# Patient Record
Sex: FEMALE | Race: BLACK OR AFRICAN AMERICAN | Employment: FULL TIME | URBAN - METROPOLITAN AREA
[De-identification: names, ages, dates, MRNs, and addresses within clinical notes are randomized per-mention and may not be internally consistent; named-entity substitution may affect disease eponyms.]

---

## 2024-05-11 ENCOUNTER — HOSPITAL ENCOUNTER (EMERGENCY)
Age: 64
Discharge: HOME | End: 2024-05-11
Attending: EMERGENCY MEDICINE
Payer: COMMERCIAL

## 2024-05-11 ENCOUNTER — APPOINTMENT (OUTPATIENT)
Dept: GENERAL RADIOLOGY | Age: 64
End: 2024-05-11
Payer: COMMERCIAL

## 2024-05-11 VITALS
RESPIRATION RATE: 14 BRPM | TEMPERATURE: 96.8 F | BODY MASS INDEX: 40.92 KG/M2 | HEIGHT: 68 IN | WEIGHT: 270 LBS | OXYGEN SATURATION: 100 % | HEART RATE: 64 BPM | DIASTOLIC BLOOD PRESSURE: 82 MMHG | SYSTOLIC BLOOD PRESSURE: 127 MMHG

## 2024-05-11 DIAGNOSIS — S96.911A ANKLE STRAIN, RIGHT, INITIAL ENCOUNTER: ICD-10-CM

## 2024-05-11 DIAGNOSIS — M62.830 SPASM OF LEFT TRAPEZIUS MUSCLE: Primary | ICD-10-CM

## 2024-05-11 PROCEDURE — 73610 X-RAY EXAM OF ANKLE: CPT

## 2024-05-11 PROCEDURE — 96372 THER/PROPH/DIAG INJ SC/IM: CPT | Performed by: EMERGENCY MEDICINE

## 2024-05-11 PROCEDURE — 6370000000 HC RX 637 (ALT 250 FOR IP)

## 2024-05-11 PROCEDURE — 99284 EMERGENCY DEPT VISIT MOD MDM: CPT | Performed by: EMERGENCY MEDICINE

## 2024-05-11 PROCEDURE — 6360000002 HC RX W HCPCS

## 2024-05-11 RX ORDER — KETOROLAC TROMETHAMINE 15 MG/ML
15 INJECTION, SOLUTION INTRAMUSCULAR; INTRAVENOUS ONCE
Status: COMPLETED | OUTPATIENT
Start: 2024-05-11 | End: 2024-05-11

## 2024-05-11 RX ORDER — LIDOCAINE 4 G/G
1 PATCH TOPICAL DAILY
Status: DISCONTINUED | OUTPATIENT
Start: 2024-05-11 | End: 2024-05-11 | Stop reason: HOSPADM

## 2024-05-11 RX ORDER — METHOCARBAMOL 750 MG/1
750 TABLET, FILM COATED ORAL 4 TIMES DAILY
Qty: 40 TABLET | Refills: 0 | Status: SHIPPED | OUTPATIENT
Start: 2024-05-11 | End: 2024-05-21

## 2024-05-11 RX ORDER — LIDOCAINE 50 MG/G
1 PATCH TOPICAL DAILY
Qty: 10 PATCH | Refills: 0 | Status: SHIPPED | OUTPATIENT
Start: 2024-05-11 | End: 2024-05-21

## 2024-05-11 RX ADMIN — KETOROLAC TROMETHAMINE 15 MG: 15 INJECTION, SOLUTION INTRAMUSCULAR; INTRAVENOUS at 14:21

## 2024-05-11 ASSESSMENT — PAIN DESCRIPTION - LOCATION: LOCATION: ANKLE;ARM

## 2024-05-11 ASSESSMENT — PAIN - FUNCTIONAL ASSESSMENT: PAIN_FUNCTIONAL_ASSESSMENT: 0-10

## 2024-05-11 ASSESSMENT — PAIN DESCRIPTION - ORIENTATION: ORIENTATION: LEFT;RIGHT

## 2024-05-11 ASSESSMENT — PAIN SCALES - GENERAL: PAINLEVEL_OUTOF10: 5

## 2024-05-11 NOTE — DISCHARGE INSTRUCTIONS
Please keep your scheduled follow-up appointment with occupational health for Monday at 2 PM.  For pain you can alternate Tylenol and ibuprofen every 4 hours.  Use the muscle relaxant, Robaxin, as needed for muscle spasming.  You may also use ice, heat and lidocaine patches which were also sent to your pharmacy.    For your ankle use the Ace wrap for compression and comfort.  Please follow the ankle mobility exercises that were provided in your discharge packet.    Please return to the emergency department if you develop neck pain, inability to move your neck, inability move your arm, loss of sensation in the arm or weakness.  Inability to walk, loss of sensation in the ankle or foot, fevers, chills, headaches, vision changes, chest pain or shortness of breath.
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
## 2024-06-12 ENCOUNTER — HOSPITAL ENCOUNTER (OUTPATIENT)
Dept: PHYSICAL THERAPY | Facility: CLINIC | Age: 64
Setting detail: THERAPIES SERIES
Discharge: HOME | End: 2024-06-12
Payer: COMMERCIAL

## 2024-06-12 PROCEDURE — 97110 THERAPEUTIC EXERCISES: CPT

## 2024-06-12 PROCEDURE — 97161 PT EVAL LOW COMPLEX 20 MIN: CPT
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
## 2024-06-17 ENCOUNTER — HOSPITAL ENCOUNTER (OUTPATIENT)
Dept: PHYSICAL THERAPY | Facility: CLINIC | Age: 64
Setting detail: THERAPIES SERIES
Discharge: HOME | End: 2024-06-17
Payer: COMMERCIAL

## 2024-06-17 NOTE — FLOWSHEET NOTE
[] [facility]  Outpatient Rehabilitation &  Therapy  [address]  [phone]  [phone] [] [facility]  Outpatient Rehabilitation &  Therapy  [address]  P: [phone]  F: [phone] [x] [facility]  Outpatient Rehabilitation &  Therapy  [address]  P: [phone]  F: [phone] [] [facility]  Outpatient Rehabilitation &  Therapy  [address]  [phone]  [phone] [] [facility]  Outpatient Rehabilitation &  Therapy  [address]   P: [phone]  F: [phone]  [] [facility]  Outpatient Rehabilitation &  Therapy  [address]  P: [phone]  F: [phone] [] [facility]  Outpatient Rehabilitation &  Therapy  [address]  P: [phone]  F: [phone] [] [facility]  Outpatient Rehabilitation &  Therapy  [address]  P: [phone]  F: [phone] [] [facility]  Outpatient Rehabilitation &  Therapy  [address]  P: [phone]  F: [phone]  [] [facility] Outpatient Rehabilitation &  Therapy  [address]  P: [phone]  F: [phone]     Therapy Cancel/No Show note    Date: 2024  Patient: Lucía Metz  : 1960  MRN: [id number]    Cancels/No Shows to date: 0/0. Today's cx will not count against pt    For today's appointment patient:    [x]  Cancelled    [] Rescheduled appointment    [] No-show     Reason given by patient:    []  Patient ill    []  Conflicting appointment    [] No transportation      [] Conflict with work    [] No reason given    [] Weather related    [] COVID-19    [] Other:      Comments:  C-9 did not include aquatics. Will submit for new C-9      [] Next appointment was confirmed    Electronically signed by: Ghazal Andre PTA

## 2024-06-19 ENCOUNTER — HOSPITAL ENCOUNTER (OUTPATIENT)
Dept: PHYSICAL THERAPY | Facility: CLINIC | Age: 64
Setting detail: THERAPIES SERIES
Discharge: HOME | End: 2024-06-19
Payer: COMMERCIAL

## 2024-06-19 PROCEDURE — 97113 AQUATIC THERAPY/EXERCISES: CPT

## 2024-06-19 NOTE — FLOWSHEET NOTE
[x] [facility] Outpatient Rehabilitation & Therapy  [address]  P: [phone]  F: [phone]     Physical Therapy Daily  Aquatic Treatment Note    Date:  2024  Patient Name:  Lucía Metz    :  1960  MRN: [id number]  Physician: Shamir Smallwood MD                                  Insurance:  APPROVED 9 VISITS (3X WK FOR 3WKS)5.10.24-24  AUTH #[id number]     Medical Diagnosis: Sprain of left shoulder, right ankle and strain of muscle, fascia and tendon at neck                         Rehab Codes: R26.2, M54.2, M25.571  Onset Date: 5/10/24                                  Next 's appt: 24    Visit# / total visits: 2/  Cancels/No Shows: 1/0    Subjective:    Pain:  [x] Yes  [] No Location: R ankle, L neck, R knee Pain Rating: (0-10 scale) 8/10 (R leg) 5-6/10( R neck)  Pain altered Tx:  [x] No  [] Yes  Action:  Comments: pt arrives today stating she is having a lot of pain today in multiple areas. States pain cont to \"spread\" d/t her walking different and compensating for her original injury.    Objective:     KEY  B = Belt G = Gloves N = Noodle   C = Cuffs K = Kickboard P = Paddles   CC = Cervical Collar L = Laps T = Theratube   DB = Dumbells M = Minutes W = Weights     Exercises/Activities  Warm-up/Amb  Dynamic Exercises    Forward 3L N March    Sideways 3L N Squat    Backwards 3L N Retro HS curls      Retro SLR    Stretches  Braiding    Gastroc/Soleus  Heel to Toe amb    Hamstring  Toe amb    Hip flexor  Heel amb    Piriformis      SKTC      Pec Stretch      Post Deltoid  Static Exercises UE      Shoulder flex/ext 10   Static Exercises LE  Shoulder abd/add 10   Heel/toe raises 10 N & UE Shoulder H.  abd/add 10   March 10 N & UE Shoulder IR/ER    Mini-squats  Rowing    4-way hip   Arm Circles    Hamstring curls 10 N & UE UT shrugs/rolls    Hip Circles/Fig 8  Scap squeezes    Ankle ROM  Diagonals 1/2    Lunges   Elbow flex/ext

## 2024-06-21 ENCOUNTER — HOSPITAL ENCOUNTER (OUTPATIENT)
Dept: PHYSICAL THERAPY | Facility: CLINIC | Age: 64
Setting detail: THERAPIES SERIES
Discharge: HOME | End: 2024-06-21
Payer: COMMERCIAL

## 2024-06-21 PROCEDURE — 97113 AQUATIC THERAPY/EXERCISES: CPT

## 2024-06-21 NOTE — FLOWSHEET NOTE
[x] [facility] Outpatient Rehabilitation & Therapy  [address]  P: [phone]  F: [phone]     Physical Therapy Daily  Aquatic Treatment Note    Date:  2024  Patient Name:  Lucía Metz    :  1960  MRN: [id number]  Physician: Shamir Smallwood MD                                  Insurance:  APPROVED 9 VISITS (3X WK FOR 3WKS)5.10.24-24  AUTH #[id number]     Medical Diagnosis: Sprain of left shoulder, right ankle and strain of muscle, fascia and tendon at neck                         Rehab Codes: R26.2, M54.2, M25.571  Onset Date: 5/10/24                                  Next 's appt: 24    Visit# / total visits: 3/9  Cancels/No Shows: 1/0    Subjective:    Pain:  [x] Yes  [] No Location: R ankle, L neck, R knee Pain Rating: (0-10 scale) 6.5/10 (R leg) 5/10( R neck)  Pain altered Tx:  [x] No  [] Yes  Action:  Comments: Pt reports she took a pain pill last night so pain is reduced slightly this am. Has been compliant with HEP.     Objective:     KEY  B = Belt G = Gloves N = Noodle   C = Cuffs K = Kickboard P = Paddles   CC = Cervical Collar L = Laps T = Theratube   DB = Dumbells M = Minutes W = Weights     Exercises/Activities  Warm-up/Amb  Dynamic Exercises    Forward 3L N March    Sideways 3L N Squat    Backwards 3L N Retro HS curls      Retro SLR    Stretches  Braiding    Gastroc/Soleus 2x10\" Heel to Toe amb    Hamstring  Toe amb    Hip flexor  Heel amb    Piriformis      SKTC      Pec Stretch      Post Deltoid  Static Exercises UE      Shoulder flex/ext 10   Static Exercises LE  Shoulder abd/add 10   Heel/toe raises 10 N & UE Shoulder H.  abd/add 10   Marches 10 N & UE Shoulder IR/ER    Mini-squats  Rowing    4-way hip   Arm Circles    Hamstring curls 10 N & UE UT shrugs/rolls x10   Hip Circles/Fig 8  Cervical ROM x10   Ankle ROM  UT S 2x20\"   Lunges   Elbow flex/ext      Pron/Sup    Functional Exercise  Wrist AROM    Step      Wall

## 2024-06-24 ENCOUNTER — HOSPITAL ENCOUNTER (OUTPATIENT)
Dept: PHYSICAL THERAPY | Facility: CLINIC | Age: 64
Setting detail: THERAPIES SERIES
Discharge: HOME | End: 2024-06-24
Payer: COMMERCIAL

## 2024-06-24 PROCEDURE — 97113 AQUATIC THERAPY/EXERCISES: CPT

## 2024-06-24 NOTE — FLOWSHEET NOTE
[x] [facility] Outpatient Rehabilitation & Therapy  [address]  P: [phone]  F: [phone]     Physical Therapy Daily  Aquatic Treatment Note    Date:  2024  Patient Name:  Lucía Metz    :  1960  MRN: [id number]  Physician: Shamir Smallwood MD                                  Insurance:  APPROVED 9 VISITS (3X WK FOR 3WKS)5.10.24-24  AUTH #[id number]     Medical Diagnosis: Sprain of left shoulder, right ankle and strain of muscle, fascia and tendon at neck                         Rehab Codes: R26.2, M54.2, M25.571  Onset Date: 5/10/24                                  Next 's appt: 24    Visit# / total visits:   Cancels/No Shows: 1/0    Subjective:    Pain:  [x] Yes  [] No Location: R ankle, L neck, R knee Pain Rating: (0-10 scale) 7/10 (R leg) 5/10( R neck)  Pain altered Tx:  [x] No  [] Yes  Action:  Comments:   Objective:     KEY  B = Belt G = Gloves N = Noodle   C = Cuffs K = Kickboard P = Paddles   CC = Cervical Collar L = Laps T = Theratube   DB = Dumbells M = Minutes W = Weights     Exercises/Activities  Warm-up/Amb  Dynamic Exercises    Forward 3L N March    Sideways 3L N Squat    Backwards 3L N Retro HS curls      Retro SLR    Stretches  Braiding    Gastroc/Soleus 2x10\" Heel to Toe amb    Hamstring  Toe amb    Hip flexor  Heel amb    Piriformis      SKTC      Pec Stretch      Post Deltoid  Static Exercises UE      Shoulder flex/ext 10   Static Exercises LE  Shoulder abd/add 10   Heel/toe raises 10 N & UE Shoulder H.  abd/add 10   Marches 10 N & UE Shoulder IR/ER    Mini-squats  Rowing      Scapular retraction 10x5\"   4-way hip   Arm Circles    Hamstring curls 10 N & UE UT shrugs/rolls x10   Hip Circles/Fig 8  Cervical ROM 10x5\"   Ankle ROM  UT S 2x20\"   Lunges   Elbow flex/ext      Pron/Sup    Functional Exercise  Wrist AROM    Step      Wall Push-ups  Deep H20/    SLS  Bike    Breast Stroke on Noodle  Hip abd/add    Noodle

## 2024-06-26 ENCOUNTER — HOSPITAL ENCOUNTER (OUTPATIENT)
Dept: PHYSICAL THERAPY | Facility: CLINIC | Age: 64
Setting detail: THERAPIES SERIES
Discharge: HOME | End: 2024-06-26
Payer: COMMERCIAL

## 2024-06-26 PROCEDURE — 97113 AQUATIC THERAPY/EXERCISES: CPT

## 2024-06-26 NOTE — FLOWSHEET NOTE
[x] [facility] Outpatient Rehabilitation & Therapy  [address]  P: [phone]  F: [phone]     Physical Therapy Daily  Aquatic Treatment Note    Date:  2024  Patient Name:  Lucía Metz    :  1960  MRN: [id number]  Physician: Shamir Smallwood MD                                  Insurance:  APPROVED 9 VISITS (3X WK FOR 3WKS)5.10.24-24  AUTH #[id number]     Medical Diagnosis: Sprain of left shoulder, right ankle and strain of muscle, fascia and tendon at neck                         Rehab Codes: R26.2, M54.2, M25.571  Onset Date: 5/10/24                                  Next 's appt: 24    Visit# / total visits:   Cancels/No Shows: 1/0    Subjective:    Pain:  [x] Yes  [] No Location: R ankle, L neck, R knee Pain Rating: (0-10 scale) 6/10 (R leg) \"stiff\"/10( R neck)  Pain altered Tx:  [x] No  [] Yes  Action:  Comments: pt arrives today stating she is having a lot of \"stabbing\" type pain in her knee which has been happening for a while. States her neck isnt necessarily in pain but it is very stiff.   Objective:     KEY  B = Belt G = Gloves N = Noodle   C = Cuffs K = Kickboard P = Paddles   CC = Cervical Collar L = Laps T = Theratube   DB = Dumbells M = Minutes W = Weights     Exercises/Activities  Warm-up/Amb  Dynamic Exercises    Forward 3L N March    Sideways 3L N Squat    Backwards 3L N Retro HS curls      Retro SLR    Stretches  Braiding    Gastroc/Soleus 2x10\" Heel to Toe amb    Hamstring  Toe amb    Hip flexor  Heel amb    Piriformis      SKTC      Pec Stretch      Post Deltoid  Static Exercises UE      Shoulder flex/ext 10   Static Exercises LE  Shoulder abd/add 10   Heel/toe raises 10  Shoulder H.  abd/add 10   Marches 10  Shoulder IR/ER    Mini-squats 10 Rowing      Scapular retraction 10x5\"   4-way hip  10 Arm Circles    Hamstring curls 10  UT shrugs/rolls x10   Hip Circles/Fig 8  Cervical ROM 10x5\"   Ankle ROM  UT S 2x20\"

## 2024-06-28 ENCOUNTER — HOSPITAL ENCOUNTER (OUTPATIENT)
Dept: PHYSICAL THERAPY | Facility: CLINIC | Age: 64
Setting detail: THERAPIES SERIES
Discharge: HOME | End: 2024-06-28
Payer: COMMERCIAL

## 2024-06-28 PROCEDURE — 97113 AQUATIC THERAPY/EXERCISES: CPT

## 2024-06-28 NOTE — FLOWSHEET NOTE
[x] [facility] Outpatient Rehabilitation & Therapy  [address]  P: [phone]  F: [phone]     Physical Therapy Daily  Aquatic Treatment Note    Date:  2024  Patient Name:  Lucía Metz    :  1960  MRN: [id number]  Physician: Shamir Smallwood MD                                  Insurance:  APPROVED 9 VISITS (3X WK FOR 3WKS)5.10.24-24  AUTH #[id number]     Medical Diagnosis: Sprain of left shoulder, right ankle and strain of muscle, fascia and tendon at neck                         Rehab Codes: R26.2, M54.2, M25.571  Onset Date: 5/10/24                                  Next 's appt:     Visit# / total visits:   Cancels/No Shows: 1/0    Subjective:    Pain:  [x] Yes  [] No Location: R ankle, L neck, R knee Pain Rating: (0-10 scale) 6/10 (R leg) 5/10( L shoulder)  Pain altered Tx:  [x] No  [] Yes  Action:  Comments: pt feels less pain overall and better ROM.  Pt feels she wants to get better to RTW as soon as possible.  Objective:     KEY  B = Belt G = Gloves N = Noodle   C = Cuffs K = Kickboard P = Paddles   CC = Cervical Collar L = Laps T = Theratube   DB = Dumbells M = Minutes W = Weights     Exercises/Activities  Warm-up/Amb  Dynamic Exercises    Forward 3L N March    Sideways 3L N Squat    Backwards 3L N Retro HS curls      Retro SLR    Stretches  Braiding    Gastroc/Soleus 2x10\" Heel to Toe amb    Hamstring  Toe amb    Hip flexor  Heel amb    Piriformis      SKTC      Pec Stretch      Post Deltoid  Static Exercises UE      Shoulder flex/ext 10   Static Exercises LE  Shoulder abd/add 10   Heel/toe raises 10  Shoulder H.  abd/add 10    10  Shoulder IR/ER    Mini-squats 10 Rowing      Scapular retraction 10x5\"   4-way hip  10 Arm Circles    Hamstring curls 10  UT shrugs/rolls x10   Hip Circles/Fig 8  Cervical ROM 10x5\"   Ankle ROM  UT S 2x20\"   Lunges   Elbow flex/ext      Pron/Sup    Functional Exercise  Wrist AROM    Step

## 2024-07-01 ENCOUNTER — HOSPITAL ENCOUNTER (OUTPATIENT)
Dept: PHYSICAL THERAPY | Facility: CLINIC | Age: 64
Setting detail: THERAPIES SERIES
Discharge: HOME | End: 2024-07-01
Payer: COMMERCIAL

## 2024-07-01 PROCEDURE — 97113 AQUATIC THERAPY/EXERCISES: CPT

## 2024-07-01 NOTE — FLOWSHEET NOTE
Education: [x] Verbal  [x] Demo  [] Written  Comprehension of Education:  [] Verbalizes understanding.  [] Demonstrates understanding.  [x] Needs review.  [] Demonstrates/verbalizes HEP/Ed previously given.     Plan: [x] Continue per plan of care.   [x] Other:Deep water hang/float done indep end of session and not billed.      Time In: 11:00 am            Time Out:  12:00 pm    Electronically signed by:  Nancie Keenan PTA

## 2024-07-03 ENCOUNTER — HOSPITAL ENCOUNTER (OUTPATIENT)
Dept: PHYSICAL THERAPY | Facility: CLINIC | Age: 64
Setting detail: THERAPIES SERIES
Discharge: HOME | End: 2024-07-03
Payer: COMMERCIAL

## 2024-07-03 ENCOUNTER — APPOINTMENT (OUTPATIENT)
Dept: GENERAL RADIOLOGY | Age: 64
End: 2024-07-03
Payer: COMMERCIAL

## 2024-07-03 ENCOUNTER — APPOINTMENT (OUTPATIENT)
Dept: CT IMAGING | Age: 64
End: 2024-07-03
Payer: COMMERCIAL

## 2024-07-03 ENCOUNTER — HOSPITAL ENCOUNTER (EMERGENCY)
Age: 64
Discharge: HOME | End: 2024-07-04
Attending: SPECIALIST
Payer: COMMERCIAL

## 2024-07-03 VITALS
DIASTOLIC BLOOD PRESSURE: 79 MMHG | SYSTOLIC BLOOD PRESSURE: 128 MMHG | OXYGEN SATURATION: 98 % | HEART RATE: 104 BPM | HEIGHT: 68 IN | TEMPERATURE: 98.1 F | WEIGHT: 274 LBS | RESPIRATION RATE: 18 BRPM | BODY MASS INDEX: 41.52 KG/M2

## 2024-07-03 DIAGNOSIS — S16.1XXA STRAIN OF NECK MUSCLE, INITIAL ENCOUNTER: ICD-10-CM

## 2024-07-03 DIAGNOSIS — S39.012A STRAIN OF LUMBAR REGION, INITIAL ENCOUNTER: ICD-10-CM

## 2024-07-03 DIAGNOSIS — S09.90XA CLOSED HEAD INJURY, INITIAL ENCOUNTER: Primary | ICD-10-CM

## 2024-07-03 PROCEDURE — 72100 X-RAY EXAM L-S SPINE 2/3 VWS: CPT

## 2024-07-03 PROCEDURE — 97113 AQUATIC THERAPY/EXERCISES: CPT

## 2024-07-03 PROCEDURE — 72040 X-RAY EXAM NECK SPINE 2-3 VW: CPT

## 2024-07-03 PROCEDURE — 99284 EMERGENCY DEPT VISIT MOD MDM: CPT

## 2024-07-03 PROCEDURE — 70450 CT HEAD/BRAIN W/O DYE: CPT

## 2024-07-03 ASSESSMENT — ENCOUNTER SYMPTOMS
SORE THROAT: 0
COUGH: 0
NAUSEA: 0
BACK PAIN: 1
SHORTNESS OF BREATH: 0
ABDOMINAL PAIN: 0

## 2024-07-03 ASSESSMENT — PAIN SCALES - GENERAL: PAINLEVEL_OUTOF10: 8

## 2024-07-03 ASSESSMENT — PAIN - FUNCTIONAL ASSESSMENT: PAIN_FUNCTIONAL_ASSESSMENT: 0-10

## 2024-07-03 NOTE — FLOWSHEET NOTE
H20/    SLS  Bike    Breast Stroke on Noodle  Hip abd/add    Noodle Twist  Hip flex/ext    Noodle Push down  Hip IR/ER    Kickboard push/pull  Knee flex/ext      Push/pull on Rail      Hang 10m    Other: Pt prefers to hang with no support and can float \"naturally.\"    Specific Instructions for next treatment: steps    Treatment Charges: Mins Units   []  Modalities     []  Ther Exercise     []  Manual Therapy     []  Ther Activities     [x]  Aquatics 35 (10:10-10:45 am) 2   []  Other       Assessment: [x] Progressing toward goals. Began with 3 directional warm up laps, pt utilizing noodle for stability, per request. Completed all interventions, per log, inc all reps this date. Max cueing needed for ex recall. Pt with good maryse to tx and no adverse effects. Concluded with deep water hang for decompression and soreness relief.     [] No change.     [] Other:    STG: (to be met in 18 treatments)  ? Pain: right ankle to 3/10 at worst to improve walking tolerance  ? Pain cervical to 2/10 at worst to improve sleep and ability to drive  ? ROM:left cervical rotation to no > 10% limitation to allow for driving  ? ROM: right ankle DF to no > 5 from N to improve quality of gait  ? Strength: right ankle to 4/5 to normalize gait  ? Strength left shoulder strength to 4+/5 to allow for lifting  Patient to be independent with home exercise program as demonstrated by performance with correct form without cues.     LTG: (to be met in 20 treatments)  LEFS 20% or less impaired  NPDI 20% or less impaired  Patient able to drive without neck pain  Normal gait without deviation     Patient goals: \"To feel better\"    Pt. Education:  [x] Yes  [] No  [] Reviewed Prior HEP/Ed  Method of Education: [x] Verbal  [] Demo  [] Written  Comprehension of Education:  [] Verbalizes understanding.  [] Demonstrates understanding.  [x] Needs review.  [] Demonstrates/verbalizes HEP/Ed previously given.     Plan: [x] Continue per plan of care.   [x] Other:Deep

## 2024-07-04 RX ORDER — IBUPROFEN 600 MG/1
600 TABLET ORAL EVERY 6 HOURS PRN
Qty: 20 TABLET | Refills: 0 | Status: SHIPPED | OUTPATIENT
Start: 2024-07-04 | End: 2024-07-11

## 2024-07-04 RX ORDER — CYCLOBENZAPRINE HCL 10 MG
10 TABLET ORAL 3 TIMES DAILY PRN
Qty: 15 TABLET | Refills: 0 | Status: SHIPPED | OUTPATIENT
Start: 2024-07-04 | End: 2024-07-14
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
## 2024-07-04 NOTE — DISCHARGE INSTRUCTIONS
Please understand that at this time there is no evidence for a more serious underlying process, but that early in the process of an illness or injury, an emergency department workup can be falsely reassuring.  You should contact your family doctor within the next 48 hours for a follow up appointment    THANK YOU!!!    From [facility] and [facility]    On behalf of the Emergency Department staff at [facility], I would like to thank you for giving us the opportunity to address your health care needs and concerns.    We hope that during your visit, our service was delivered in a professional and caring manner. Please keep [facility] in mind as we walk with you down the path to your own personal wellness.     Please expect an automated text message or email from us so we can ask a few questions about your health and progress. Based on your answers, a clinician may call you back to offer help and instructions.    Please understand that early in the process of an illness or injury, an emergency department workup can be falsely reassuring.  If you notice any worsening, changing or persistent symptoms please call your family doctor or return to the ER immediately.     Tell us how we did during your visit at [email or web address]   and let us know about your experience

## 2024-07-05 ENCOUNTER — HOSPITAL ENCOUNTER (EMERGENCY)
Age: 64
Discharge: HOME | End: 2024-07-05
Attending: EMERGENCY MEDICINE
Payer: COMMERCIAL

## 2024-07-05 ENCOUNTER — HOSPITAL ENCOUNTER (OUTPATIENT)
Dept: PHYSICAL THERAPY | Facility: CLINIC | Age: 64
Setting detail: THERAPIES SERIES
End: 2024-07-05
Payer: COMMERCIAL

## 2024-07-05 ENCOUNTER — APPOINTMENT (OUTPATIENT)
Dept: PHYSICAL THERAPY | Facility: CLINIC | Age: 64
End: 2024-07-05
Payer: COMMERCIAL

## 2024-07-05 ENCOUNTER — HOSPITAL ENCOUNTER (OUTPATIENT)
Dept: PHYSICAL THERAPY | Facility: CLINIC | Age: 64
Setting detail: THERAPIES SERIES
Discharge: HOME | End: 2024-07-05
Payer: COMMERCIAL

## 2024-07-05 VITALS
DIASTOLIC BLOOD PRESSURE: 78 MMHG | BODY MASS INDEX: 41.22 KG/M2 | WEIGHT: 272 LBS | HEIGHT: 68 IN | SYSTOLIC BLOOD PRESSURE: 118 MMHG | RESPIRATION RATE: 16 BRPM | TEMPERATURE: 97.7 F | HEART RATE: 76 BPM | OXYGEN SATURATION: 99 %

## 2024-07-05 DIAGNOSIS — M54.2 NECK PAIN: Primary | ICD-10-CM
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
## 2024-07-05 PROCEDURE — 99283 EMERGENCY DEPT VISIT LOW MDM: CPT

## 2024-07-05 PROCEDURE — 6370000000 HC RX 637 (ALT 250 FOR IP): Performed by: PHYSICIAN ASSISTANT

## 2024-07-05 PROCEDURE — 97110 THERAPEUTIC EXERCISES: CPT

## 2024-07-05 RX ORDER — ACETAMINOPHEN 500 MG
1000 TABLET ORAL ONCE
Status: COMPLETED | OUTPATIENT
Start: 2024-07-05 | End: 2024-07-05

## 2024-07-05 RX ADMIN — ACETAMINOPHEN 1000 MG: 500 TABLET ORAL at 20:51

## 2024-07-05 ASSESSMENT — PAIN DESCRIPTION - LOCATION
LOCATION: HEAD;NECK
LOCATION: NECK;FACE

## 2024-07-05 ASSESSMENT — PAIN DESCRIPTION - PAIN TYPE: TYPE: ACUTE PAIN

## 2024-07-05 ASSESSMENT — PAIN - FUNCTIONAL ASSESSMENT: PAIN_FUNCTIONAL_ASSESSMENT: 0-10

## 2024-07-05 NOTE — PROGRESS NOTES
[] [facility]  Outpatient Rehabilitation &  Therapy  [address]  [phone]  [phone] [] [facility]  Outpatient Rehabilitation &  Therapy  [address]  P: [phone]  F: [phone] [x] [facility]  Outpatient Rehabilitation &  Therapy  [address]  P: [phone]  F: [phone] [] [facility]  Outpatient Rehabilitation &  Therapy  [address]  [phone]  [phone] [] [facility]  Outpatient Rehabilitation &  Therapy  [address]   P: [phone]  F: [phone]  [] [facility]  Outpatient Rehabilitation &  Therapy  [address]  P: [phone]  F: [phone] [] [facility]  Outpatient Rehabilitation &  Therapy  [address]  P: [phone]  F: [phone] [] [facility]  Outpatient Rehabilitation &  Therapy  [address]  P: [phone]  F: [phone] [] [facility]  Outpatient Rehabilitation &  Therapy  [address]  P: [phone]  F: [phone]  [] [facility] Outpatient Rehabilitation &  Therapy  [address]  P: [phone]  F: [phone]     Physical Therapy Daily Treatment Note/Progress Note    Date:  2024  Patient Name:  Lucía Metz    :  1960  MRN: [id number]  Physician: Shamir Smallwood MD                                  Insurance:  APPROVED 9 VISITS (3X WK FOR 3WKS)[id number]  AUTH #[id number]     Medical Diagnosis: Sprain of left shoulder, right ankle and strain of muscle, fascia and tendon at neck                         Rehab Codes: R26.2, M54.2, M25.571  Onset Date: 5/10/24                                  Next 's appt:   Visit# / total visits: ;     Cancels/No Shows: 0    Subjective:    Pain:  [x] Yes  [] No Location: right lateral

## 2024-07-06 ASSESSMENT — ENCOUNTER SYMPTOMS
SORE THROAT: 0
VOMITING: 0
COLOR CHANGE: 0
WHEEZING: 0
EYE ITCHING: 0
BACK PAIN: 0
COUGH: 0
EYE DISCHARGE: 0
NAUSEA: 0
EYE PAIN: 0
RHINORRHEA: 0

## 2024-07-06 NOTE — DISCHARGE INSTRUCTIONS
Please take the medications that you were prescribed including the Tylenol, Motrin and Flexeril.    You may apply ice to your head for 15 to 20 minutes every 2-3 hours and apply a heat pack to the back of your neck every couple of hours.  Do not sleep on a heating pad as it may cause burns    Please follow-up with occupational health on Monday    Return to the emergency department for any emergent concerns

## 2024-07-06 NOTE — ED PROVIDER NOTES
Attending Supervisory Note/Shared Visit   I have personally performed a face to face diagnostic evaluation on this patient. I have reviewed the mid-level’s findings and agree.      (Please note that portions of this note were completed with a voice recognition program.  Efforts were made to edit the dictations but occasionally words are mis-transcribed.)    Brennan Goff MD  Attending Emergency Physician        Brennan Goff MD  07/05/24 6833
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
## 2024-07-23 ENCOUNTER — HOSPITAL ENCOUNTER (OUTPATIENT)
Dept: PHYSICAL THERAPY | Facility: CLINIC | Age: 64
Setting detail: THERAPIES SERIES
Discharge: HOME | End: 2024-07-23
Payer: COMMERCIAL

## 2024-07-23 PROCEDURE — 97161 PT EVAL LOW COMPLEX 20 MIN: CPT
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
## 2024-07-25 ENCOUNTER — HOSPITAL ENCOUNTER (OUTPATIENT)
Dept: PHYSICAL THERAPY | Facility: CLINIC | Age: 64
Setting detail: THERAPIES SERIES
Discharge: HOME | End: 2024-07-25
Payer: COMMERCIAL

## 2024-07-25 PROCEDURE — 97113 AQUATIC THERAPY/EXERCISES: CPT
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
## 2024-08-01 ENCOUNTER — HOSPITAL ENCOUNTER (OUTPATIENT)
Dept: PHYSICAL THERAPY | Facility: CLINIC | Age: 64
Setting detail: THERAPIES SERIES
Discharge: HOME | End: 2024-08-01
Payer: COMMERCIAL

## 2024-08-01 PROCEDURE — 97113 AQUATIC THERAPY/EXERCISES: CPT
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
## 2024-08-05 ENCOUNTER — HOSPITAL ENCOUNTER (OUTPATIENT)
Dept: PHYSICAL THERAPY | Facility: CLINIC | Age: 64
Setting detail: THERAPIES SERIES
Discharge: HOME | End: 2024-08-05
Payer: COMMERCIAL

## 2024-08-05 PROCEDURE — 97113 AQUATIC THERAPY/EXERCISES: CPT

## 2024-08-05 NOTE — FLOWSHEET NOTE
flex/ext 10   Static Exercises LE  Shoulder abd/add 10   Heel/toe raises  Shoulder H.  abd/add 10   Marches  Shoulder IR/ER    Mini-squats  Rowing 10   4-way hip  10 (5 ea side d/t pain) Arm Circles    Hamstring curls  UT shrugs/rolls 15 back   Hip Circles/Fig 8  Scap squeezes    Ankle ROM  Diagonals 1/2    Lunges   Elbow flex/ext      Pron/Sup    Functional Exercise  Wrist AROM    Step      Wall Push-ups  Deep H20/    SLS  Bike    Breast Stroke on Noodle  Hip abd/add    Noodle Twist 10 Hip flex/ext    Noodle Push down  Hip IR/ER    Kickboard push/pull  Knee flex/ext      Push/pull on Rail      Hang 5-10m 2N, kb   Other:    Specific Instructions for next treatment: Aquatics for cervical ROM, ROM right shoulder, core strength and lumbar ROM     Treatment Charges: Mins Units    []  Modalities      []  Ther Exercise      []  Manual Therapy      []  Ther Activities      [x]  Aquatics 35 2 4:20- 4:55   []  Other      *Deep water hang not included in billing    Assessment: [x] Progressing toward goals. Began tx with warm up laps; pt used kb for support; reported LB pain after first backwards lap therefore held remainder. Completed interventions, per chart above, for gentle strengthening. Pt reports \"needle stick\" pain with 3 way hip therefore held remaining reps. Able to complete remainder of interventions without adverse effect. Concluded with deep water hang.    [] No change.     [] Other:  STG: (to be met in 9 treatments)  ? Pain: to 5/10 at worst to improve ability to sleep, stand, and use right UE for reaching  ? ROM: right shoulder flexion to 110 degrees to allow for OH reach  ? ROM: lumbar flexion to at least 50 degrees to allow for bending  ? Strength: right shoulder flexion to 3+/5 to allow for reaching  Patient to be independent with home exercise program as demonstrated by performance with correct form without cues.  Demonstrate Knowledge of fall prevention  LTG: (to be met in 15 treatments)  Improve SPDI to

## 2024-08-07 ENCOUNTER — HOSPITAL ENCOUNTER (OUTPATIENT)
Dept: PHYSICAL THERAPY | Facility: CLINIC | Age: 64
Setting detail: THERAPIES SERIES
Discharge: HOME | End: 2024-08-07
Payer: COMMERCIAL

## 2024-08-07 NOTE — FLOWSHEET NOTE
[] [facility]  Outpatient Rehabilitation &  Therapy  [address]  [phone]  [phone] [] [facility]  Outpatient Rehabilitation &  Therapy  [address]  P: [phone]  F: [phone] [x] [facility]  Outpatient Rehabilitation &  Therapy  [address]  P: [phone]  F: [phone] [] [facility]  Outpatient Rehabilitation &  Therapy  [address]  [phone]  [phone] [] [facility]  Outpatient Rehabilitation &  Therapy  [address]   P: [phone]  F: [phone]  [] [facility]  Outpatient Rehabilitation &  Therapy  [address]  P: [phone]  F: [phone] [] [facility]  Outpatient Rehabilitation &  Therapy  [address]  P: [phone]  F: [phone] [] [facility]  Outpatient Rehabilitation &  Therapy  [address]  P: [phone]  F: [phone] [] [facility]  Outpatient Rehabilitation &  Therapy  [address]  P: [phone]  F: [phone]  [] [facility] Outpatient Rehabilitation &  Therapy  [address]  P: [phone]  F: [phone]     Therapy Cancel/No Show note    Date: 2024  Patient: Lucía Metz  : 1960  MRN: [id number]    Cancels/No Shows to date: 10    For today's appointment patient:    [x]  Cancelled    [] Rescheduled appointment    [] No-show     Reason given by patient:    []  Patient ill    []  Conflicting appointment    [] No transportation      [] Conflict with work    [] No reason given    [] Weather related    [] COVID-19    [x] Other:      Comments:  Await new C-9 approval.      [] Next appointment was confirmed    Electronically signed by: DAYNE BARROSO, PTA

## 2024-08-09 ENCOUNTER — APPOINTMENT (OUTPATIENT)
Dept: PHYSICAL THERAPY | Facility: CLINIC | Age: 64
End: 2024-08-09
Payer: COMMERCIAL

## 2024-09-11 ENCOUNTER — HOSPITAL ENCOUNTER (OUTPATIENT)
Dept: PHYSICAL THERAPY | Facility: CLINIC | Age: 64
Setting detail: THERAPIES SERIES
Discharge: HOME | End: 2024-09-11
Payer: MEDICARE

## 2024-09-11 PROCEDURE — 97161 PT EVAL LOW COMPLEX 20 MIN: CPT

## 2024-09-13 ENCOUNTER — CLINICAL DOCUMENTATION (OUTPATIENT)
Dept: PHYSICAL THERAPY | Facility: CLINIC | Age: 64
End: 2024-09-13

## 2024-09-16 ENCOUNTER — HOSPITAL ENCOUNTER (OUTPATIENT)
Dept: PHYSICAL THERAPY | Facility: CLINIC | Age: 64
Setting detail: THERAPIES SERIES
Discharge: HOME | End: 2024-09-16
Payer: MEDICARE

## 2024-09-16 PROCEDURE — 97113 AQUATIC THERAPY/EXERCISES: CPT

## 2024-09-25 ENCOUNTER — HOSPITAL ENCOUNTER (OUTPATIENT)
Dept: PHYSICAL THERAPY | Facility: CLINIC | Age: 64
Setting detail: THERAPIES SERIES
Discharge: HOME | End: 2024-09-25
Payer: MEDICARE

## 2024-09-25 PROCEDURE — 97113 AQUATIC THERAPY/EXERCISES: CPT

## 2024-10-04 ENCOUNTER — HOSPITAL ENCOUNTER (OUTPATIENT)
Dept: PHYSICAL THERAPY | Facility: CLINIC | Age: 64
Setting detail: THERAPIES SERIES
Discharge: HOME | End: 2024-10-04
Payer: MEDICARE

## 2024-10-04 PROCEDURE — 97113 AQUATIC THERAPY/EXERCISES: CPT

## 2024-10-04 NOTE — FLOWSHEET NOTE
[x] [facility] Outpatient Rehabilitation & Therapy  [address]  P: [phone]  F: [phone]     Physical Therapy Daily  Aquatic Treatment Note    Date:  10/4/2024  Patient Name:  Lucía Metz    :  1960  MRN: [id number]  Physician: Jeremiah Rehman MD                               Insurance: BCBS Medicare--Approved 6 visits 24-24 Auth #[id number], [id number], [id number], [id number], [id number], [id number]   Medical Diagnosis: M47.812 (ICD-10-CM) - Spondylosis without myelopathy or radiculopathy, cervical region  M15.9 (ICD-10-CM) - Polyosteoarthritis, unspecified  Rehab Codes: M62.81, M54.5, R26.2  Onset Date: 2024             Next 's appt.: tbd    Visit# / total visits:   Cancels/No Shows: 0/0    Subjective:    Pain:  [x] Yes  [] No Location: LBP  Pain Rating: (0-10 scale) 7/10  Pain altered Tx:  [x] No  [] Yes  Action:  Comments: Pt reports cont LB pain today and took a pain pill prior to therapy today.  Pt arrives without an appt but able to accommodate.     Objective:     KEY  B = Belt G = Gloves N = Noodle   C = Cuffs K = Kickboard P = Paddles   CC = Cervical Collar L = Laps T = Theratube   DB = Dumbells M = Minutes W = Weights     Exercises/Activities  Warm-up/Amb 10/4 Dynamic Exercises 10/   Forward 3L - 2N & KB March    Sideways 3L - 2N & KB Squat    Backwards 2L- 2N & KB Retro HS curls      Retro SLR    Stretches  Braiding    Gastroc/Soleus  Heel to Toe amb    Hamstring  Toe amb    Hip flexor  Heel amb    Piriformis      SKTC      Pec Stretch      Post Deltoid  Static Exercises UE Back against rail     Shoulder flex/ext 10   Static Exercises LE  Shoulder abd/add 10   Heel/toe raises 10 Shoulder H.  abd/add 10   March 10- small ROM  Shoulder IR/ER    Mini-squats Pain Rowing 10   4-way hip  7x ea - Abd only d/t pain Arm Circles    Hamstring curls 8x ea UT shrugs/rolls    Hip Circles/Fig 8  Scap squeezes    Ankle ROM  Diagonals 1/2    Lunges   Elbow flex/ext

## 2024-10-09 ENCOUNTER — HOSPITAL ENCOUNTER (OUTPATIENT)
Dept: PHYSICAL THERAPY | Facility: CLINIC | Age: 64
Setting detail: THERAPIES SERIES
Discharge: HOME | End: 2024-10-09
Payer: MEDICARE

## 2024-10-09 PROCEDURE — 97113 AQUATIC THERAPY/EXERCISES: CPT

## 2024-10-09 NOTE — FLOWSHEET NOTE
Step      Wall Push-ups  Deep H20/    SLS  Bike 10x   Breast Stroke on Noodle  Hip abd/add    Noodle Twist 4x Hip flex/ext    Noodle Push down  Hip IR/ER    Kickboard push/pull  Knee flex/ext      Push/pull on Rail      Hang 10m+   Other:    Specific Instructions for next treatment: Begin with aquatics progressing to land based therapy     Treatment Charges: Mins Units   []  Modalities     []  Ther Exercise     []  Manual Therapy     []  Ther Activities     [x]  Aquatics 35 2   []  Other       Assessment: [] Progressing toward goals.      [] No change.     [x] Other: Does not tolerate an increase in retro ambulation distance due to back pain. Tolerated an increase in heel raise from 10x to 12x. Instructed to increase small ROM marches from 10x to 12x, but only able to complete 8x. Increased hip abduction from 7x to 10x. Breaks between exercise for stretching into trunk flexion with positive response in relieving symptoms. Attempt standing hip flexion, completed 3x bilaterally but unable to complete do to low back tightening up. Added gentle noodle twist, does not tolerate with out without intentional abdominal bracing, causes too much pressure. Deep water hang at end for lumbar decompression, patient also floats on back for decreased forces on back.   STG: (to be met in 8 treatments)  ? Pain: Patient to report 6/10 max LBP with sit to stand and walking  ? ROM:  ? Strength:  ? Function: Patient to demonstrate the ability to complete sit to stand without stopping half way through motion to reduce fall risk  Patient to be independent with home exercise program as demonstrated by performance with correct form without cues.  Demonstrate Knowledge of fall prevention  LTG: (to be met in 16 treatments)  Patient to report 3/10 max LBP with sit to stand and walking  Patient to demonstrate TUG 20sec to improve efficiency with ambulation  Patient to demonstrate the ability to maintain a position x30 mins  Patient to

## 2024-10-16 ENCOUNTER — HOSPITAL ENCOUNTER (OUTPATIENT)
Dept: PHYSICAL THERAPY | Facility: CLINIC | Age: 64
Setting detail: THERAPIES SERIES
End: 2024-10-16
Payer: MEDICARE

## 2024-10-16 ENCOUNTER — HOSPITAL ENCOUNTER (OUTPATIENT)
Dept: PHYSICAL THERAPY | Facility: CLINIC | Age: 64
Setting detail: THERAPIES SERIES
Discharge: HOME | End: 2024-10-16
Payer: MEDICARE

## 2024-10-16 NOTE — FLOWSHEET NOTE
[] [facility]  Outpatient Rehabilitation &  Therapy  [address]  [phone]  [phone] [] [facility]  Outpatient Rehabilitation &  Therapy  [address]  P: [phone]  F: [phone] [x] [facility]  Outpatient Rehabilitation &  Therapy  [address]  P: [phone]  F: [phone] [] [facility]  Outpatient Rehabilitation &  Therapy  [address]  [phone]  [phone] [] [facility]  Outpatient Rehabilitation &  Therapy  [address]   P: [phone]  F: [phone]  [] [facility]  Outpatient Rehabilitation &  Therapy  [address]  P: [phone]  F: [phone] [] [facility]  Outpatient Rehabilitation &  Therapy  [address]  P: [phone]  F: [phone] [] [facility]  Outpatient Rehabilitation &  Therapy  [address]  P: [phone]  F: [phone] [] [facility]  Outpatient Rehabilitation &  Therapy  [address]  P: [phone]  F: [phone]  [] [facility] Outpatient Rehabilitation &  Therapy  [address]  P: [phone]  F: [phone]     Therapy Cancel/No Show note    Date: 10/16/2024  Patient: Lucía Metz  : 1960  MRN: [id number]    Cancels/No Shows to date: 1 NS/ 1 CX    For today's appointment patient:    [x]  Cancelled    [x] Rescheduled appointment    [] No-show     Reason given by patient:    []  Patient ill    []  Conflicting appointment    [] No transportation      [] Conflict with work    [] No reason given    [] Weather related    [] COVID-19    [x] Other:      Comments:  Pt called and states she forgot about appt today.  Re sched for next week.      [x] Next appointment was confirmed    Electronically signed by: DAYNE BARROSO PTA

## 2024-10-23 ENCOUNTER — HOSPITAL ENCOUNTER (OUTPATIENT)
Dept: PHYSICAL THERAPY | Facility: CLINIC | Age: 64
Setting detail: THERAPIES SERIES
Discharge: HOME | End: 2024-10-23
Payer: MEDICARE

## 2024-10-23 PROCEDURE — 97113 AQUATIC THERAPY/EXERCISES: CPT

## 2024-10-23 NOTE — FLOWSHEET NOTE
[x] [facility] Outpatient Rehabilitation & Therapy  [address]  P: [phone]  F: [phone]     Physical Therapy Daily  Aquatic Treatment Note    Date:  10/23/2024  Patient Name:  Lucía Metz    :  1960  MRN: [id number]  Physician: Jeremiah Rehman MD                               Insurance: [employer] Medicare--Approved 6 visits 24-24 Auth #[id number]   Medical Diagnosis: M47.812 (ICD-10-CM) - Spondylosis without myelopathy or radiculopathy, cervical region  M15.9 (ICD-10-CM) - Polyosteoarthritis, unspecified  Rehab Codes: M62.81, M54.5, R26.2  Onset Date: 2024             Next 's appt.: tbd    Visit# / total visits:   Cancels/No Shows:     Subjective:    Pain:  [x] Yes  [] No Location: LBP  Pain Rating: (0-10 scale) 8/10  Pain altered Tx:  [x] No  [] Yes  Action:  Comments: Pt states an inc in back pain this date. Reports she is feeling stronger but her back pain is not easing up and is causing her to lose sleep and interfering with daily life.     Objective:     KEY  B = Belt G = Gloves N = Noodle   C = Cuffs K = Kickboard P = Paddles   CC = Cervical Collar L = Laps T = Theratube   DB = Dumbells M = Minutes W = Weights     Exercises/Activities  Warm-up/Amb 10/23 Dynamic Exercises 10/23   Forward 3L -  KB March    Sideways 3L - KB Squat    Backwards 1.5L- KB Retro HS curls      Retro SLR    Stretches  Braiding    Gastroc/Soleus  Heel to Toe amb    Hamstring  Toe amb    Hip flexor  Heel amb    Piriformis      SKTC      Pec Stretch      Post Deltoid  Static Exercises UE Back against rail     Shoulder flex/ext 10   Static Exercises LE  2N Shoulder abd/add 10   Heel/toe raises 12x Shoulder H.  abd/add 10   March 12x- small ROM  Shoulder IR/ER    Mini-squats 4x  Rowing 10   4-way hip  7x Abd, 6x Flex Arm Circles    Hamstring curls 8x ea UT shrugs/rolls    Hip Circles/Fig 8  Scap squeezes    Ankle ROM  Diagonals

## 2024-10-23 NOTE — PROGRESS NOTES
[] [facility]  Outpatient Rehabilitation &  Therapy  [address]  [phone]  [phone] [] [facility]  Outpatient Rehabilitation &  Therapy  [address]  P: [phone]  F: [phone] [x] [facility]  Outpatient Rehabilitation &  Therapy  [address]  P: [phone]  F: [phone] [] [facility]  Outpatient Rehabilitation &  Therapy  [address]  [phone]  [phone] [] [facility]  Outpatient Rehabilitation &  Therapy  [address]   P: [phone]  F: [phone]  [] [facility]  Outpatient Rehabilitation &  Therapy  [address]  P: [phone]  F: [phone] [] [facility]  Outpatient Rehabilitation &  Therapy  [address]  P: [phone]  F: [phone] [] [facility]  Outpatient Rehabilitation &  Therapy  [address]  P: [phone]  F: [phone] [] [facility]  Outpatient Rehabilitation &  Therapy  [address]  P: [phone]  F: [phone]  [] [facility] Outpatient Rehabilitation &  Therapy  [address]  P: [phone]  F: [phone]     Physical Therapy Progress Note    Date: 10/23/2024      Patient: Lucía Metz  : 1960  MRN: [id number]  Physician: Jeremiah Rehman MD                                 Insurance: [facility] Medicare--Approved 6 visits 24-24 Auth #[id number], [id number], [id number], [id number], [id number]   Medical Diagnosis: 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

## 2024-10-24 ENCOUNTER — CLINICAL DOCUMENTATION (OUTPATIENT)
Dept: PHYSICAL THERAPY | Facility: CLINIC | Age: 64
End: 2024-10-24

## 2024-12-11 ENCOUNTER — CLINICAL DOCUMENTATION (OUTPATIENT)
Dept: PHYSICAL THERAPY | Facility: CLINIC | Age: 64
End: 2024-12-11

## 2024-12-11 NOTE — THERAPY DISCHARGE
[] [facility]  Outpatient Rehabilitation &  Therapy  [address]  [phone]  [phone] [] [facility]  Outpatient Rehabilitation &  Therapy  [address]  P: [phone]  F: [phone] [x] [facility]  Outpatient Rehabilitation &  Therapy  [address]  P: [phone]  F: [phone] [] [facility]  Outpatient Rehabilitation &  Therapy  [address]  [phone]  [phone] [] [place]  Outpatient Rehabilitation &  Therapy  [address]   P: [phone]  F: [phone]  [] [facility]  Outpatient Rehabilitation &  Therapy  [address]  P: [phone]  F: [phone] [] [facility]  Outpatient Rehabilitation &  Therapy  [address]  P: [phone]  F: [phone] [] [facility]  Outpatient Rehabilitation &  Therapy  [address]  P: [phone]  F: [phone] [] [facility]  Outpatient Rehabilitation &  Therapy  [address]  P: [phone]  F: [phone]  [] [facility] Outpatient Rehabilitation &  Therapy  [address]  P: [phone]  F: [phone]     Physical Therapy Discharge Note    Date: 2024      Patient: Lucía Metz  : 1960  MRN: [id number]   Physician: Jeremiah Rehman MD                                 Insurance: [facility] Medicare--Approved 6 visits 24-24 Auth #[id number], [id number], [id number]   Medical Diagnosis: 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